# Patient Record
Sex: FEMALE | Race: WHITE | NOT HISPANIC OR LATINO | Employment: FULL TIME | URBAN - METROPOLITAN AREA
[De-identification: names, ages, dates, MRNs, and addresses within clinical notes are randomized per-mention and may not be internally consistent; named-entity substitution may affect disease eponyms.]

---

## 2019-07-01 ENCOUNTER — OFFICE VISIT (OUTPATIENT)
Dept: PHYSICAL MEDICINE AND REHAB | Facility: CLINIC | Age: 34
End: 2019-07-01
Payer: COMMERCIAL

## 2019-07-01 VITALS
HEIGHT: 63 IN | HEART RATE: 67 BPM | DIASTOLIC BLOOD PRESSURE: 77 MMHG | WEIGHT: 161 LBS | SYSTOLIC BLOOD PRESSURE: 122 MMHG | BODY MASS INDEX: 28.53 KG/M2

## 2019-07-01 DIAGNOSIS — M25.551 RIGHT HIP PAIN: Primary | ICD-10-CM

## 2019-07-01 DIAGNOSIS — S76.911A MUSCLE STRAIN OF RIGHT THIGH, INITIAL ENCOUNTER: ICD-10-CM

## 2019-07-01 PROCEDURE — 3008F BODY MASS INDEX DOCD: CPT | Mod: CPTII,S$GLB,, | Performed by: PHYSICAL MEDICINE & REHABILITATION

## 2019-07-01 PROCEDURE — 99204 OFFICE O/P NEW MOD 45 MIN: CPT | Mod: S$GLB,,, | Performed by: PHYSICAL MEDICINE & REHABILITATION

## 2019-07-01 PROCEDURE — 99204 PR OFFICE/OUTPT VISIT, NEW, LEVL IV, 45-59 MIN: ICD-10-PCS | Mod: S$GLB,,, | Performed by: PHYSICAL MEDICINE & REHABILITATION

## 2019-07-01 PROCEDURE — 99999 PR PBB SHADOW E&M-NEW PATIENT-LVL III: ICD-10-PCS | Mod: PBBFAC,,, | Performed by: PHYSICAL MEDICINE & REHABILITATION

## 2019-07-01 PROCEDURE — 97110 PR THERAPEUTIC EXERCISES: ICD-10-PCS | Mod: S$GLB,,, | Performed by: PHYSICAL MEDICINE & REHABILITATION

## 2019-07-01 PROCEDURE — 97110 THERAPEUTIC EXERCISES: CPT | Mod: S$GLB,,, | Performed by: PHYSICAL MEDICINE & REHABILITATION

## 2019-07-01 PROCEDURE — 99999 PR PBB SHADOW E&M-NEW PATIENT-LVL III: CPT | Mod: PBBFAC,,, | Performed by: PHYSICAL MEDICINE & REHABILITATION

## 2019-07-01 PROCEDURE — 3008F PR BODY MASS INDEX (BMI) DOCUMENTED: ICD-10-PCS | Mod: CPTII,S$GLB,, | Performed by: PHYSICAL MEDICINE & REHABILITATION

## 2019-07-01 NOTE — PATIENT INSTRUCTIONS
"Posture - Healthy spines and healthy running both start with good posture. The more you learn to sit and stand with good posture throughout the day, the more likely you are to run with it. Too often people allow the pelvis to tilt forward, the low back to arch, and the shoulders and head to round forward - similar to this picture from "Anatomy for Runners":        This posture leads to more weight on the heel than forefoot, compromising balance; and over-striding with running with lack of full use of the glutes for propulsion.  Fix it by following these steps when standing and before starting your run:  1. Squeeze the glutes to set the pelvis.  2. Drop your chest/rib cage down and lean forward just enough to feel equal weight on heels and forefeet.  3. Rotate arms/hands outward to pull the shoulder blades back, then allow forearms to rotate back in.     The girl above would then look more like this:      Follow link to a video reminder: [email or web address]    Mobilization:     Its generally a good idea to assess yourself for hotspots once a month by briefly mobilizing everything with a  or foam roller, and then spend several minutes every other day of the week, for 4 - 6 weeks working on those painful spots using thumbs, foam roll, rolling pins, lacrosse ball, etc. If it hurts, then you need to do it. Once it no longer hurts, take a break on that area for a few months.     Hip Flexor Mobility:       Picture from "Anatomy for Runners" by LISA Mehta    Quadriceps Mobilization:    Spend about 30 - 60 seconds on the whole quad, hitting the outside, middle, and inside portions.    IT Band Mobilization: Using 'the Stick', a foam roller, or lacross ball, roll all aspects of the IT band, but not over bony areas, for a minute or two every other day for about 6 weeks. You can also try pausing over a painful area and then flexing and extending the knee for 20 seconds. " "    From "Anatomy for Runners" by LISA Mehta    Working through these progressions:    Unless otherwise stated, you should only be doing one exercise from each progression listed below. These are listed in order of difficulty, so start with the first one in each list. Do as many reps as you can while maintaining excellent form. Stop doing the exercise if you fatigue or can't maintain proper form. Once you can do the recommended amount of reps for that exercise, stop doing that one and move on to the next exercise in that progression list during your next workout.     Hip Flexor Strengthening Progression:    1. Isometric hip flexion: 3 x 30 seconds    2. Lying Theraband Hip Flexor: 3 x 8 - 12 reps  Keep low back flat against floor. Alternate legs.      3. Standing Theraband Hip Flexor: 3 x 8 - 12      Bridge Progression: The purpose is to practice pushing the leg back using the glutes while maintaining a neutral spine. Start with your foot close enough to touch the heel. Brace your core without letting your back arch or flatten, or rotate for single leg exercises. Squeeze the glutes and drive down through the heel. If you feel tightness in the low back, you need to either brace the core more, use the glutes more, or don't lift the hips quite as high. Arms down is easier, arms up is harder.     1. Double leg bridge with arms up: 3 x 10 - 20        2. Bridge march with arms up: 3 x 20 Don't allow the hips to rotate.        3. Single leg bridge arms up: 3 x 10 - 15 each leg         Hip Abduction Progression:      Lateral control - This is one of the most common problems with runners and can contribute to many different injuries and wasted energy. Improving hip strength will help significantly. As your hips get stronger, think about actively squeezing your glutes when you run - that will help get these muscles firing. Do only 1 of these exercises per workout and advance to the next exercise once you can do the " recommended amount of reps.     1. Clam shells: 3 x 30  Stay perpendicular to ground, knees bent 90 degrees, feet lined up even with low back. Brace core & squeeze glutes throughout the exercise. Lift top knee only as far as you can without rolling backwards.           2. Side lying leg lift (keep leg slightly extended): 3 x 15      3. Seven way hips: Start with 3 x 2 - 3 reps and progress to 3 x 8 - 10 reps. Follow this link:  https://www.Tocagenube.com/watch?v=YdenOdoz-MI      4. Standing hip hikes: 3 x 20  (bonus points: hold core tight, good posture with weight on forefoot, and toe yoga)        5. Jennifer hip exercises: 3 x 8 - 12 (Start with 8 reps. Once you build up to 12, use a tighter band.)    A. Theraband pulling straight out to side.             B. Theraband pulling 45 degrees posterior.            C. Standing hip rotations with resistance:  https://www.Tocagenube.com/watch?v=gfHmUIuDmG0          6. Monster Walks: 3 x 30 - 60 seconds. See video: https://www.Tocagenube.com/watch?k=fgA760NJMwr  You could also do monster walks going around a small square with 5 to 10 foot sides. Go around it in each direction.    7. Standing Clamshells: 3 x 8 - 12 reps  https://www.Tocagenube.com/watch?v=CGOsTJoK0OF      Stretching for Range of Motion:    Remember not to do static stretching before running or working out. Do something to warm up instead. Its best to stretch after runs or workouts, or any time on rest days. Stretch at least 4 days per week, 1 - 2 minutes per stretch. It will take 10 - 12 weeks to get the full benefit.     Hip flexors: Use whichever stretch you prefer, the kneeling or lunging type, or switch them up to keep it exciting.     rotate pelvis backwards and flatten the low back                       Hold this position for several minutes.

## 2019-07-01 NOTE — PROGRESS NOTES
PM&R NEW PATIENT HISTORY & PHYSICAL:    Referring Physician: Self, Aaareferral    Chief Complaint   Patient presents with    Hip Pain     Right hip pain       HPI: This is a 34 y.o.  female being seen in clinic today for evaluation of Hip Pain (Right hip pain)   The problem first began several months ago without particular injury. She feels tightness and pain in her right hip. She has started doing more HIIT exercises and classes since January. Noticed she seems tighter on that side. The symptoms show no change. She hasn't tried particular treatment. She has not tried physical therapy. She's been doing Regymen around 3 or 4 days a week and thinks she may need something lower impact. She does not run outside of those workouts. 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     History obtained from patient.    Past family, medical, social, surgical history, and vital signs reviewed in chart.    Review of Systems   Constitutional: Negative for chills, fever and weight loss.   HENT: Negative for hearing loss and sore throat.    Eyes: Negative for blurred vision, photophobia and pain.   Respiratory: Negative for shortness of breath.    Cardiovascular: Negative for chest pain.   Gastrointestinal: Negative for abdominal pain.   Genitourinary: Negative for dysuria.   Skin: Negative for rash.   Neurological: Negative for tingling and headaches.   Endo/Heme/Allergies: Does not bruise/bleed easily.   Psychiatric/Behavioral: Negative for depression.       Physical Exam   Constitutional: She is oriented to person, place, and time. She appears well-developed and well-nourished.   HENT:   Head: Normocephalic and atraumatic.   Eyes: Pupils are equal, round, and reactive to light. EOM are normal.   Neck: Normal range of motion. Neck supple.   Cardiovascular: Intact distal pulses.   Pulmonary/Chest: Effort normal.   Abdominal: She exhibits no distension.   Musculoskeletal:        Right hip: She exhibits decreased range of motion (mildly tighter  rectus femoris on right > left hip. Normal IR/ER.), decreased strength (4/5 hip abduction. Pain with resisted hip flexion, more so with knee straight.) and tenderness (near AIIS and over femoral head/insertion of iliopsoas).   Neurological: She is alert and oriented to person, place, and time. She has normal strength and normal reflexes. No sensory deficit.   Skin: Skin is warm and dry.   Psychiatric: She has a normal mood and affect.   Vitals reviewed.      IMPRESSION/PLAN: Tawnya is a 34 y.o.  female with:    1. Right hip pain    2. Muscle strain of right thigh, initial encounter     The findings were discussed with Kasey in detail. The hip joint seems fine. This appears to be muscular, probably the rectus femoris. We discussed that backing off the HIIT training and focusing more on strength training and better control during exercise would be helpful. We also discussed therapeutic exercises to do as part of a HEP and she was taught these, given a theraband to use, and given the exercises in writing. All of her questions were answered. She was provided this plan in writing. She will follow-up with me in 4 weeks. If she isn't making enough progress on her own, we'll get her into PT.     Paige Paige M.D.  Physical Medicine and Rehab

## 2019-07-31 ENCOUNTER — OFFICE VISIT (OUTPATIENT)
Dept: DERMATOLOGY | Facility: CLINIC | Age: 34
End: 2019-07-31
Payer: COMMERCIAL

## 2019-07-31 ENCOUNTER — PATIENT MESSAGE (OUTPATIENT)
Dept: DERMATOLOGY | Facility: CLINIC | Age: 34
End: 2019-07-31

## 2019-07-31 DIAGNOSIS — R22.9 SUBCUTANEOUS NODULE: Primary | ICD-10-CM

## 2019-07-31 DIAGNOSIS — L72.0 MILIA: ICD-10-CM

## 2019-07-31 PROCEDURE — 99999 PR PBB SHADOW E&M-EST. PATIENT-LVL II: CPT | Mod: PBBFAC,,, | Performed by: STUDENT IN AN ORGANIZED HEALTH CARE EDUCATION/TRAINING PROGRAM

## 2019-07-31 PROCEDURE — 99202 PR OFFICE/OUTPT VISIT, NEW, LEVL II, 15-29 MIN: ICD-10-PCS | Mod: S$GLB,,, | Performed by: STUDENT IN AN ORGANIZED HEALTH CARE EDUCATION/TRAINING PROGRAM

## 2019-07-31 PROCEDURE — 99202 OFFICE O/P NEW SF 15 MIN: CPT | Mod: S$GLB,,, | Performed by: STUDENT IN AN ORGANIZED HEALTH CARE EDUCATION/TRAINING PROGRAM

## 2019-07-31 PROCEDURE — 99999 PR PBB SHADOW E&M-EST. PATIENT-LVL II: ICD-10-PCS | Mod: PBBFAC,,, | Performed by: STUDENT IN AN ORGANIZED HEALTH CARE EDUCATION/TRAINING PROGRAM

## 2019-07-31 NOTE — PROGRESS NOTES
Subjective:       Patient ID:  Tawnya Herrera is a 34 y.o. female who presents for   Chief Complaint   Patient presents with    Cyst     in front of right ear x 2 months, hard, tender, no tx     History of Present Illness: The patient presents with chief complaint of lesion  Location: in front of right ear  Duration: 2 months  Signs/Symptoms: hard nodule, painful when chewing food  Prior treatments: none    Patient complains of lesion(s) - skin lesion  Location: bridge of nose  Duration: months  Symptoms: firm papule  Relieving factors/Previous treatments: none        Review of Systems   Skin: Negative for itching, rash and dry skin.        Objective:    Physical Exam   Constitutional: She appears well-developed and well-nourished. No distress.   Neurological: She is alert and oriented to person, place, and time. She is not disoriented.   Psychiatric: She has a normal mood and affect.   Skin:   Areas Examined (abnormalities noted in diagram):   Head / Face Inspection Performed  Neck Inspection Performed              Diagram Legend     Erythematous scaling macule/papule c/w actinic keratosis       Vascular papule c/w angioma      Pigmented verrucoid papule/plaque c/w seborrheic keratosis      Yellow umbilicated papule c/w sebaceous hyperplasia      Irregularly shaped tan macule c/w lentigo     1-2 mm smooth white papules consistent with Milia      Movable subcutaneous cyst with punctum c/w epidermal inclusion cyst      Subcutaneous movable cyst c/w pilar cyst      Firm pink to brown papule c/w dermatofibroma      Pedunculated fleshy papule(s) c/w skin tag(s)      Evenly pigmented macule c/w junctional nevus     Mildly variegated pigmented, slightly irregular-bordered macule c/w mildly atypical nevus      Flesh colored to evenly pigmented papule c/w intradermal nevus       Pink pearly papule/plaque c/w basal cell carcinoma      Erythematous hyperkeratotic cursted plaque c/w SCC      Surgical scar with no sign of  skin cancer recurrence      Open and closed comedones      Inflammatory papules and pustules      Verrucoid papule consistent consistent with wart     Erythematous eczematous patches and plaques     Dystrophic onycholytic nail with subungual debris c/w onychomycosis     Umbilicated papule    Erythematous-base heme-crusted tan verrucoid plaque consistent with inflamed seborrheic keratosis     Erythematous Silvery Scaling Plaque c/w Psoriasis     See annotation      Assessment / Plan:        Subcutaneous nodule - appears very deep to the skin and likely within the parotid gland. Will have patient to schedule with ENT for evaluation .     Milia  Lesion on nose, lanced with 11 blade and manually expressed.            Follow up if symptoms worsen or fail to improve.

## 2019-08-01 ENCOUNTER — OFFICE VISIT (OUTPATIENT)
Dept: OTOLARYNGOLOGY | Facility: CLINIC | Age: 34
End: 2019-08-01
Payer: COMMERCIAL

## 2019-08-01 VITALS
HEART RATE: 63 BPM | BODY MASS INDEX: 29.21 KG/M2 | WEIGHT: 164.88 LBS | SYSTOLIC BLOOD PRESSURE: 104 MMHG | TEMPERATURE: 99 F | DIASTOLIC BLOOD PRESSURE: 67 MMHG

## 2019-08-01 DIAGNOSIS — K11.8 PAROTID MASS: Primary | ICD-10-CM

## 2019-08-01 PROCEDURE — 99999 PR PBB SHADOW E&M-EST. PATIENT-LVL II: CPT | Mod: PBBFAC,,, | Performed by: PHYSICIAN ASSISTANT

## 2019-08-01 PROCEDURE — 99999 PR PBB SHADOW E&M-EST. PATIENT-LVL II: ICD-10-PCS | Mod: PBBFAC,,, | Performed by: PHYSICIAN ASSISTANT

## 2019-08-01 PROCEDURE — 99203 OFFICE O/P NEW LOW 30 MIN: CPT | Mod: S$GLB,,, | Performed by: PHYSICIAN ASSISTANT

## 2019-08-01 PROCEDURE — 3008F BODY MASS INDEX DOCD: CPT | Mod: CPTII,S$GLB,, | Performed by: PHYSICIAN ASSISTANT

## 2019-08-01 PROCEDURE — 3008F PR BODY MASS INDEX (BMI) DOCUMENTED: ICD-10-PCS | Mod: CPTII,S$GLB,, | Performed by: PHYSICIAN ASSISTANT

## 2019-08-01 PROCEDURE — 99203 PR OFFICE/OUTPT VISIT, NEW, LEVL III, 30-44 MIN: ICD-10-PCS | Mod: S$GLB,,, | Performed by: PHYSICIAN ASSISTANT

## 2019-08-01 NOTE — PROGRESS NOTES
Referring Provider:    Zaheer Carvajal Md  [address] [facility]  [place] [address]  Subjective:   Patient: Tawnya Herrera [id number], :1985   Visit date:2019 3:37 PM    Chief Complaint:  Lesion (In front of right ear noticed 2wks ago)    HPI:  Tawnya is a 34 y.o. female who I was asked to see in consultation for evaluation of the following issue(s):    Patient first presented to clinic on 19 for evaluation of a nodule in front of her R ear x 2 weeks. Patient reported first noticing this as well as associated pain when chewing chewy things such as jerky or a granola bar. She denied any hx of TMJ. No growth/change. No other mass or lymphadenopathy. She has never smoked.  No recent illness, no fevers. No unintentional weight loss. No relieving factors.     Review of Systems:  Negative unless checked off.  Gen:  []fever   []fatigue  HENT:  []nosebleeds  []dental problem   Eyes:  []photophobia  []visual disturbance  Resp:  []chest tightness []wheezing  Card:  []chest pain  []leg swelling  GI:  []abdominal pain []blood in stool  :  []dysuria  []hematuria  Musc:  []joint swelling  []gait problem  Skin:  []color change  []pallor  Neuro:  []seizures  []numbness  Hem:  []bruise/bleed easily  Psych:  []hallucinations  []behavioral problems  Allergy/Imm: has No Known Allergies.    Her meds, allergies, medical, surgical, social & family histories were reviewed & updated:  -     She currently has no medications in their medication list.  -     She  has no past medical history on file.   -     She does not have any pertinent problems on file.   -     She  has no past surgical history on file.  -     She  reports that she has never smoked. She has never used smokeless tobacco. She reports that she drinks alcohol.  -     Her family history is not on file.  -     She has No Known Allergies.    Objective:     Physical Exam:  Vitals:  /67   Pulse 63   Temp 98.7 °F (37.1 °C) (Tympanic)   Wt 74.8 kg  (164 lb 14.5 oz)   BMI 29.21 kg/m²   General appearance:  Well developed, well nourished    Eyes:  Extraocular motions intact, PERRL    Communication:  no hoarseness, no dysphonia    Ears:  Otoscopy of external auditory canals and tympanic membranes was normal, clinical speech reception thresholds grossly intact, no mass/lesion of auricle.  Nose:  No masses/lesions of external nose, nasal mucosa, septum, and turbinates were within normal limits.  Mouth:  No mass/lesion of lips, teeth, gums, hard/soft palate, tongue, tonsils, or oropharynx. Subluxation of R TMJ with opening her jaw. Small, soft palpable area of R parotid with mild tenderness.     Cardiovascular:  No pedal edema; Radial Pulses +2     Neck & Lymphatics:  No cervical lymphadenopathy, no neck mass/crepitus/ asymmetry, trachea is midline, no thyroid enlargement/tenderness/mass.    Psych: Oriented x3,  Alert with normal mood and affect.     Respiration/Chest:  Symmetric expansion during respiration, normal respiratory effort.    Skin:  Warm and intact. No ulcerations of face, scalp, neck.    Assessment & Plan:   Tawnya was seen today for lesion.    Diagnoses and all orders for this visit:    Parotid mass  -     MRI Maxillofacial W W/O Contrast; Future    Pt was evaluated by myself and Dr. Herrera. I really believe most of her discomfort is coming from the jaw/arthralgia especially with chewing. Pt defers this. Region of tenderness has her concerned as well as the discomfort. Due to location (above facial nerve) will obtain imaging to further evaluate for mass. I will call pt with results.     We discussed her medical conditions, treatments and plan.  Tawnya should return to clinic if any issues arise (symptoms worsen or persist), otherwise we will see her back in the clinic only as needed.    Thank you for allowing me to participate in the care of Tawnya.      Kirstin Arredondo PA-C  [facility]   [facility]  [address] [place] [address]  P: [phone]  F: [phone]

## 2022-02-18 DIAGNOSIS — M25.571 BILATERAL ANKLE PAIN, UNSPECIFIED CHRONICITY: Primary | ICD-10-CM

## 2022-02-18 DIAGNOSIS — M25.572 BILATERAL ANKLE PAIN, UNSPECIFIED CHRONICITY: Primary | ICD-10-CM

## 2022-02-21 ENCOUNTER — HOSPITAL ENCOUNTER (OUTPATIENT)
Dept: RADIOLOGY | Facility: HOSPITAL | Age: 37
Discharge: HOME | End: 2022-02-21
Attending: PHYSICAL MEDICINE & REHABILITATION
Payer: COMMERCIAL

## 2022-02-21 ENCOUNTER — OFFICE VISIT (OUTPATIENT)
Dept: SPORTS MEDICINE | Facility: CLINIC | Age: 37
End: 2022-02-21
Payer: COMMERCIAL

## 2022-02-21 VITALS — HEIGHT: 64 IN | WEIGHT: 184 LBS | BODY MASS INDEX: 31.41 KG/M2

## 2022-02-21 DIAGNOSIS — R26.9 GAIT ABNORMALITY: ICD-10-CM

## 2022-02-21 DIAGNOSIS — M25.571 ACUTE BILATERAL ANKLE PAIN: Primary | ICD-10-CM

## 2022-02-21 DIAGNOSIS — M25.572 BILATERAL ANKLE PAIN, UNSPECIFIED CHRONICITY: ICD-10-CM

## 2022-02-21 DIAGNOSIS — M25.572 ACUTE BILATERAL ANKLE PAIN: Primary | ICD-10-CM

## 2022-02-21 DIAGNOSIS — M25.571 BILATERAL ANKLE PAIN, UNSPECIFIED CHRONICITY: ICD-10-CM

## 2022-02-21 PROCEDURE — 99999 PR PBB SHADOW E&M-EST. PATIENT-LVL IV: ICD-10-PCS | Mod: PBBFAC,,, | Performed by: PHYSICAL MEDICINE & REHABILITATION

## 2022-02-21 PROCEDURE — 73610 XR ANKLE COMPLETE 3 VIEW BILATERAL: ICD-10-PCS | Mod: 26,,, | Performed by: RADIOLOGY

## 2022-02-21 PROCEDURE — 73610 X-RAY EXAM OF ANKLE: CPT | Mod: TC,50

## 2022-02-21 PROCEDURE — 99214 OFFICE O/P EST MOD 30 MIN: CPT | Mod: S$GLB,,, | Performed by: PHYSICAL MEDICINE & REHABILITATION

## 2022-02-21 PROCEDURE — 1159F PR MEDICATION LIST DOCUMENTED IN MEDICAL RECORD: ICD-10-PCS | Mod: CPTII,S$GLB,, | Performed by: PHYSICAL MEDICINE & REHABILITATION

## 2022-02-21 PROCEDURE — 3008F BODY MASS INDEX DOCD: CPT | Mod: CPTII,S$GLB,, | Performed by: PHYSICAL MEDICINE & REHABILITATION

## 2022-02-21 PROCEDURE — 99214 PR OFFICE/OUTPT VISIT, EST, LEVL IV, 30-39 MIN: ICD-10-PCS | Mod: S$GLB,,, | Performed by: PHYSICAL MEDICINE & REHABILITATION

## 2022-02-21 PROCEDURE — 1160F PR REVIEW ALL MEDS BY PRESCRIBER/CLIN PHARMACIST DOCUMENTED: ICD-10-PCS | Mod: CPTII,S$GLB,, | Performed by: PHYSICAL MEDICINE & REHABILITATION

## 2022-02-21 PROCEDURE — 3008F PR BODY MASS INDEX (BMI) DOCUMENTED: ICD-10-PCS | Mod: CPTII,S$GLB,, | Performed by: PHYSICAL MEDICINE & REHABILITATION

## 2022-02-21 PROCEDURE — 73610 X-RAY EXAM OF ANKLE: CPT | Mod: 26,,, | Performed by: RADIOLOGY

## 2022-02-21 PROCEDURE — 1160F RVW MEDS BY RX/DR IN RCRD: CPT | Mod: CPTII,S$GLB,, | Performed by: PHYSICAL MEDICINE & REHABILITATION

## 2022-02-21 PROCEDURE — 99999 PR PBB SHADOW E&M-EST. PATIENT-LVL IV: CPT | Mod: PBBFAC,,, | Performed by: PHYSICAL MEDICINE & REHABILITATION

## 2022-02-21 PROCEDURE — 1159F MED LIST DOCD IN RCRD: CPT | Mod: CPTII,S$GLB,, | Performed by: PHYSICAL MEDICINE & REHABILITATION

## 2022-02-21 NOTE — PROGRESS NOTES
SPORTS MEDICINE / PM&R New Patient Visit :    Referring Physician: Self, Aaareferral    Chief Complaint   Patient presents with    Left Ankle - Pain    Right Ankle - Pain       HPI: This is a 36 y.o.  female being seen in clinic today for evaluation of Pain of the Left Ankle and Pain of the Right Ankle      The problem began 4 months ago.  She feels dull, intermittent and pain with movement pain in her bilateral ankles. The symptoms show no change. She has tried essential oils and rest without improvement. She has not tried therapy.     History obtained from patient.  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  She states that since increasing the mileage she developed an aggravating stiffness in her bilateral ankles.  She was able to run in the 1/4 marathon on January 15, 2022 and hasn't really been able to run since then.  She was running in [facility] and is slowly transitioning into [facility].      Past family, medical, social, surgical history, and vital signs reviewed in chart.    General    Nursing note and vitals reviewed.  Constitutional: She is oriented to person, place, and time. She appears well-developed and well-nourished.   HENT:   Head: Normocephalic and atraumatic.   Eyes: Conjunctivae and EOM are normal. Pupils are equal, round, and reactive to light.   Neck: Neck supple.   Cardiovascular: Intact distal pulses.    Pulmonary/Chest: Effort normal. No respiratory distress.   Abdominal: She exhibits no distension.   Neurological: She is alert and oriented to person, place, and time. She has normal reflexes.   Psychiatric: She has a normal mood and affect.         Right Ankle/Foot Exam     Inspection   Deformity: absent  Erythema: absent  Bruising: Ankle - absent Foot - absent  Effusion: Ankle - absent Foot - absent  Atrophy: Ankle - absent Foot - absent    Range of Motion   Ankle Joint   Dorsiflexion: normal   Plantar flexion:  normal   Subtalar Joint   Inversion: normal   Eversion: normal   First MTP Joint: normal    Alignment   Hindfoot Alignment: neutral  Forefoot Alignment: normal    Muscle Strength   The patient has normal right ankle strength.    Other   Sensation: normal    Comments:  She was tender to palpation near the anterior aspect of the medial malleolus and the anterior medial ankle joint bilaterally.  There is no significant tenderness over the tip posterior tendon or the plantar fascia.  She had relatively well-preserved range of motion bilaterally.  She was noted to have flexible pes planus and could not maintain arch height in stance.  She did show calcaneal inversion with double leg calf raises.  She had somewhat poor balance and stability in single leg stance.    Left Ankle/Foot Exam     Inspection  Deformity: absent  Erythema: absent  Bruising: Ankle - absent Foot - absent  Effusion: Ankle - absent Foot - absent  Atrophy: Ankle - absent Foot - absent    Range of Motion   Ankle Joint  Dorsiflexion: normal   Plantar flexion: normal     Subtalar Joint   Inversion: normal   Eversion: normal   First MTP Joint: normal    Alignment   Hindfoot Alignment: neutral  Forefoot Alignment: normal    Muscle Strength   The patient has normal left ankle strength.    Other   Sensation: normal      Reflexes     Left Side  Achilles:  2+    Right Side   Achilles:  2+    Vascular Exam     Right Pulses  Dorsalis Pedis:      2+          Left Pulses  Dorsalis Pedis:      2+              X-Ray Ankle Complete Bilateral  Narrative: EXAMINATION:  XR ANKLE COMPLETE 3 VIEW BILATERAL    CLINICAL HISTORY:  Pain in right ankle and joints of right foot    TECHNIQUE:  AP, lateral and oblique views of both ankles were performed.    COMPARISON:  None    FINDINGS:  No fracture or dislocation.  No radiographic evidence of an osteochondral lesion.  Well corticated density projects along the inferior margin of the right lateral malleolus suggestive of an  accessory ossicle.  Soft tissues are within normal limits.  Impression: As above    Electronically signed by: Maikel Rome MD  Date:    02/21/2022  Time:    08:04         Patient Instructions     Assessment:  Tawnya Herrera is a 36 y.o. female   Chief Complaint   Patient presents with    Left Ankle - Pain    Right Ankle - Pain       Acute bilateral ankle pain    Gait abnormality        Plan:   Discussed radiologist report with patient and agree with findings.   Discussed shoe wear while training with patient and suggested that patient alternate shoes that she runs in. Research has found that alternating shoes while training have a lower incidence of injury   Basically a neutral foot type, with slight pronation and suggests that additional ankle control and strengthening should be targeted.   Refer patient for physical therapy, if no improvement in 4 weeks then will consider advanced imaging.     Discussed trying over the counter orthotics   Continue with walking and slowly progress to a walk / jog pattern being careful not to overdue to avoid aggravation.    Safe Start to Running  Unless you have a track nearby or well-known distances, it is much easier to buy a wristwatch and exercise by time. Start with walking a distance/time that you can do without much difficulty.  Aim for 3 days a week. When you can comfortably walk 30 minutes, then you can start running.  Walk 4:30, then jog 30 seconds (this is like one set), and repeat 6 times for a total exercise time of 30 minutes.   Each week decrease the walk time and increase the run time by 30 seconds for each set.    As your running time increases, you may want to consider adding an initial walking time to warm-up before the run, but dont count this toward the 30 minute total.  When you can run 30 minutes straight, you may consider adding an additional day of running.  Again, start this fourth day at less of a total run time, but you should be able to  build up time much more quickly.    Always remember the activity guidelines:  1. No running with pain > 3/10 on the 0 - 10 pain scale.  2. No running if limping or changing your gait.  3. Ok to run with a pain that goes away with running, but NOT ok to run with a pain that gets worse the further you go.  4. Increase your total weekly mileage or time by no more than 10%    Its better to go too slow than too fast! Not only do your heart and lungs need to adapt, but it takes your bones, muscles, ligaments, and tendons weeks to months to adapt to the strain of running. You cant rush greatness!    Toe Yoga - The purpose is to give you a smarter, stronger, and more stable foot. The big toe accounts for about 85% of our stability, get it stronger! Start with coordination:  1. Keeping little toes down, lift the big toe.  2. Put big toe down, lift the small toes.  3. Alternate for 20 - 30 seconds before taking a break.  4. Progress from doing this sitting to standing to single leg stance.        For Foot Strength:  1. Pick the little toes up, drive the big toe down without curling it.  2. Gradually hold this squeezing for longer and longer.  3. Again progress from sitting to standing to single leg stance.  4. Start incorporating this with standing exercises like squats and deadlifts and all single leg work.     For a good primer, check out this link - toe yoga coordination - step 1    And then this one - toe yoga strength - steps 2 & 3      Or this one - Are You Ready to go Minimal: ready to go minimal video        Follow-up: 6 weeks or sooner if there are any problems between now and then.    Thank you for choosing [facility] and Dr. Paige Paige for your orthopedic & sports medicine care. It is our goal to provide you with exceptional care that will help keep you healthy, active, and get you back in the game.    Please do not hesitate to reach out to us via email, phone, or MyChart with any  questions, concerns, or feedback.    If you felt that you received exemplary care today, please consider leaving us feedback on Healthgrades at:  [email or web address]     If you are experiencing pain/discomfort ,or have questions after 5pm and would like to be connected to the [facility] on-call team, please call this number and specify which Sports Medicine provider is treating you: [phone]         Disclaimer: This note was prepared using a voice recognition system and is likely to have sound alike errors within the text.     Paige Paige M.D.  Sports Medicine

## 2022-02-21 NOTE — PATIENT INSTRUCTIONS
Assessment:  Tawnya Herrera is a 36 y.o. female   Chief Complaint   Patient presents with    Left Ankle - Pain    Right Ankle - Pain       Acute bilateral ankle pain    Gait abnormality        Plan:  Discussed radiologist report with patient and agree with findings.  Discussed shoe wear while training with patient and suggested that patient alternate shoes that she runs in. Research has found that alternating shoes while training have a lower incidence of injury  Basically a neutral foot type, with slight pronation and suggests that additional ankle control and strengthening should be targeted.  Refer patient for physical therapy, if no improvement in 4 weeks then will consider advanced imaging.    Discussed trying over the counter orthotics  Continue with walking and slowly progress to a walk / jog pattern being careful not to overdue to avoid aggravation.    Safe Start to Running  Unless you have a track nearby or well-known distances, it is much easier to buy a wristwatch and exercise by time. Start with walking a distance/time that you can do without much difficulty.  Aim for 3 days a week. When you can comfortably walk 30 minutes, then you can start running.  Walk 4:30, then jog 30 seconds (this is like one set), and repeat 6 times for a total exercise time of 30 minutes.   Each week decrease the walk time and increase the run time by 30 seconds for each set.    As your running time increases, you may want to consider adding an initial walking time to warm-up before the run, but dont count this toward the 30 minute total.  When you can run 30 minutes straight, you may consider adding an additional day of running.  Again, start this fourth day at less of a total run time, but you should be able to build up time much more quickly.    Always remember the activity guidelines:  1. No running with pain > 3/10 on the 0 - 10 pain scale.  2. No running if limping or changing your gait.  3. Ok to run with a pain  that goes away with running, but NOT ok to run with a pain that gets worse the further you go.  4. Increase your total weekly mileage or time by no more than 10%    Its better to go too slow than too fast! Not only do your heart and lungs need to adapt, but it takes your bones, muscles, ligaments, and tendons weeks to months to adapt to the strain of running. You cant rush greatness!    Toe Yoga - The purpose is to give you a smarter, stronger, and more stable foot. The big toe accounts for about 85% of our stability, get it stronger! Start with coordination:  1. Keeping little toes down, lift the big toe.  2. Put big toe down, lift the small toes.  3. Alternate for 20 - 30 seconds before taking a break.  4. Progress from doing this sitting to standing to single leg stance.        For Foot Strength:  1. Pick the little toes up, drive the big toe down without curling it.  2. Gradually hold this squeezing for longer and longer.  3. Again progress from sitting to standing to single leg stance.  4. Start incorporating this with standing exercises like squats and deadlifts and all single leg work.     For a good primer, check out this link - toe yoga coordination - step 1    And then this one - toe yoga strength - steps 2 & 3      Or this one - Are You Ready to go Minimal: ready to go minimal video        Follow-up: 6 weeks or sooner if there are any problems between now and then.    Thank you for choosing [facility] and Dr. Paige Paige for your orthopedic & sports medicine care. It is our goal to provide you with exceptional care that will help keep you healthy, active, and get you back in the game.    Please do not hesitate to reach out to us via email, phone, or [employer] with any questions, concerns, or feedback.    If you felt that you received exemplary care today, please consider leaving us feedback on Healthgrades at:  [email or web address]     If you are  experiencing pain/discomfort ,or have questions after 5pm and would like to be connected to the [facility] on-call team, please call this number and specify which Sports Medicine provider is treating you: [phone]

## 2022-03-07 ENCOUNTER — CLINICAL SUPPORT (OUTPATIENT)
Dept: REHABILITATION | Facility: HOSPITAL | Age: 37
End: 2022-03-07
Payer: COMMERCIAL

## 2022-03-07 DIAGNOSIS — R26.9 GAIT ABNORMALITY: ICD-10-CM

## 2022-03-07 DIAGNOSIS — M25.571 ACUTE BILATERAL ANKLE PAIN: ICD-10-CM

## 2022-03-07 DIAGNOSIS — M25.572 ACUTE BILATERAL ANKLE PAIN: ICD-10-CM

## 2022-03-07 PROCEDURE — 97110 THERAPEUTIC EXERCISES: CPT

## 2022-03-07 PROCEDURE — 97161 PT EVAL LOW COMPLEX 20 MIN: CPT

## 2022-03-07 NOTE — PLAN OF CARE
[facility]  Physical Therapy Initial Evaluation    Name: Tawnya Herrera  Clinic Number: [id number]    Therapy Diagnosis:   Encounter Diagnoses   Name Primary?    Acute bilateral ankle pain     Gait abnormality      Physician: Paige Paige MD    Physician Orders: PT Eval and Treat    Medical Diagnosis from Referral: bilateral ankle pain   Evaluation Date: 3/7/2022  Authorization Period Expiration: 12/01/2022  Plan of Care Expiration: 5/2/2022  Visit # / Visits authorized: 1/ 1    Time In: 5:00 pm   Time Out: 6:00 pm   Total Billable Time: 25 minutes    Precautions: Standard    Subjective   Date of onset: x months   History of current condition - Kasey reports: she began developing pain in bilateral ankles at the end of last year. She had increased her jogging mileage slightly as she prepared for a 1/4 marathon run. After the run it seemed to be worse and now she has continued nagging aching pain in both ankles . Will vary in intensity and which foot is worse .   No correlation with change in shoes but did increase mileage.      Pain:  Current 1/10, worst 5/10, best 0/10   Location: bilateral medial malleolus    Description: Aching and Dull. No numbness / tingling   Aggravating Factors: Standing, Walking and jogging   Easing Factors: rest    Prior Therapy: none  Social History:   lives alone  Occupation: x ray tech  Prior Level of Function: independent   Current Level of Function: independent     Imaging, x -ray 2-:   No fracture or dislocation.  No radiographic evidence of an osteochondral lesion.  Well corticated density projects along the inferior margin of the right lateral malleolus suggestive of an accessory ossicle.  Soft tissues are within normal limits.    Medical History:   No past medical history on file.    Surgical History:   Tawnya Herrera  has a past surgical history that includes [place] tooth extraction and Intrauterine device insertion  (12/13/2021).    Medications:   Tawnya has a current medication list which includes the following prescription(s): misoprostol and tinidazole, and the following Facility-Administered Medications: copper intrauterine device and misoprostol.    Allergies:   Review of patient's allergies indicates:  No Known Allergies     Pts goals: to have pain free walking / running - able to resume recreational activity    Objective       CMS Impairment/Limitation/Restriction for FOTO Survey    Therapist reviewed FOTO scores for Tawnya Herrera on 3/7/2022.   FOTO documents entered into [employer] - see Media section.    Limitation Score: 25%       Squat:  Double leg: mild pes planus   Single leg: genu valgum R>L with mild pes planus and replication of symptoms     Structure/observation: No significant swelling or bruising , no discoloration     Ankle A/PROM:      (L)   (R)  Plantar Flexion   WNL   WNL  Dorsi Flexion   WNL   WNL  Inversion   WNL   WNL  Eversion   WNL   WNL  Great Toe Extension   90 deg   90 deg   * pain on overpressure with eversion     Strength L/R:  Anterior tibialis   5/5   5/5  Posterior tibialis  5/5   5/5  Gastrocnemius  5/5   5/5  Hip abduction    4/5   4/5  Hip Ext Rotation  3+/5   3+/5    Special test L/R:   Anterior Drawer negative   negative  Talar Tilt  negative   negative      Joint mobility:  Mild decreased tibial glide and plantar navicular glide     Lower Limb Tension Test:  Negative      Tenderness to palpation:  Tenderness at the talar-navicular joint and over medial malleollus         TREATMENT   Treatment Time In: 5:00 pm   Treatment Time Out: 6:00 pm   Total Treatment time separate from Evaluation: 25 minutes    Kasey received therapeutic exercises to develop strength, endurance and ROM for 25 minutes including:  Toe yoga   Single leg step down / squat  Side ly hip external rotation  Single leg RDL    Home Exercises and Patient Education Provided    Education provided:   -Education on condition,  HEP, and plan of care     Written Home Exercises Provided: yes.  Exercises were reviewed and Kasey was able to demonstrate them prior to the end of the session.  Kasey demonstrated good  understanding of the education provided.     See EMR under Patient Instructions for exercises provided 3/7/2022.    Assessment   Tawnya is a 36 y.o. female referred to outpatient Physical Therapy with a medical diagnosis of acute bilateral ankle pain. Pt presents with tenderness vaguely about the medial malleoli that , on palpation appears to be localized about the talonavicular joint and extends more broadly to the medial malleolus itself. Her general ROM and strength about the ankle is good , however she dos have some weakness of the hip rotators and in a close chain single leg squat is noted to have a general vagus pattern with pronation of the foot and replication of pain. It is likely that with multiple repetitions of single leg landing she is exceeding tissue tolerance at the dorsomedial ankle.     Pt prognosis is Excellent.   Pt will benefit from skilled outpatient Physical Therapy to address the deficits stated above and in the chart below, provide pt/family education, and to maximize pt's level of independence.     Plan of care discussed with patient: Yes  Pt's spiritual, cultural and educational needs considered and patient is agreeable to the plan of care and goals as stated below:     Anticipated Barriers for therapy: none    Medical Necessity is demonstrated by the following  History  Co-morbidities and personal factors that may impact the plan of care Co-morbidities:   none significant     Personal Factors:   no deficits     low   Examination  Body Structures and Functions, activity limitations and participation restrictions that may impact the plan of care Body Regions:   lower extremities    Body Systems:    strength  gross coordinated movement  gait  motor control    Participation Restrictions:   Jogging, squatting  ", recreational exercise , work    Activity limitations:   Learning and applying knowledge  no deficits    General Tasks and Commands  no deficits    Communication  no deficits    Mobility  walking    Self care  looking after one's health    Domestic Life  no deficits    Interactions/Relationships  no deficits    Life Areas  employment    Community and Social Life  community life  recreation and leisure         low   Clinical Presentation stable and uncomplicated low   Decision Making/ Complexity Score: low     Goals:  Short Term Goals:    1.I with HEP  2.Patient to report a subjective decrease in pain   3. Patient to demo step down from 4" step with good knee / hip control and no valgus    Long Term Goals:  1. Patient to perform daily activities including walking community distances and at work without limitation.  2. Patient to demonstrate increased hip ER strength to 4/5 or greater.  3. Patient to have no pain on passive eversion  4. Patient to be able to jog x 30 mins with no pain.      Plan   Plan of care Certification: 3/7/2022 to 5/2/2022.    Outpatient Physical Therapy 2 times weekly for 8 weeks to include the following interventions: Electrical Stimulation prn, dry needling prn, Manual Therapy, Moist Heat/ Ice, Neuromuscular Re-ed, Orthotic Management and Training, Patient Education, Self Care, Therapeutic Activities and Therapeutic Exercise.     Alexander Pruitt, PT    Thank you for this referral.    These services are reasonable and necessary for the conditions set forth above while under my care.      "

## 2022-03-09 PROBLEM — R26.9 GAIT ABNORMALITY: Status: ACTIVE | Noted: 2022-03-09

## 2022-03-09 PROBLEM — M25.572 ACUTE BILATERAL ANKLE PAIN: Status: ACTIVE | Noted: 2022-03-09

## 2022-03-09 PROBLEM — M25.571 ACUTE BILATERAL ANKLE PAIN: Status: ACTIVE | Noted: 2022-03-09

## 2022-03-14 ENCOUNTER — CLINICAL SUPPORT (OUTPATIENT)
Dept: REHABILITATION | Facility: HOSPITAL | Age: 37
End: 2022-03-14
Payer: COMMERCIAL

## 2022-03-14 DIAGNOSIS — M25.571 ACUTE BILATERAL ANKLE PAIN: Primary | ICD-10-CM

## 2022-03-14 DIAGNOSIS — M25.572 ACUTE BILATERAL ANKLE PAIN: Primary | ICD-10-CM

## 2022-03-14 PROCEDURE — 97110 THERAPEUTIC EXERCISES: CPT

## 2022-03-14 NOTE — PROGRESS NOTES
"[facility]   Physical Therapy Treatment Note     Name: Tawnya Herrera  Clinic Number: [id number]    Therapy Diagnosis:   Encounter Diagnosis   Name Primary?    Acute bilateral ankle pain Yes     Physician: Paige Paige MD    Visit Date: 3/14/2022    Physician Orders: PT Eval and Treat    Medical Diagnosis from Referral: bilateral ankle pain   Evaluation Date: 3/7/2022  Authorization Period Expiration: 12/01/2022  Plan of Care Expiration: 5/2/2022  Visit # / Visits authorized: 1/20    PTA Visit #: --- /5     Time In: 6:30 am   Time Out: 7:20 am   Total Billable Time: 45 minutes    SUBJECTIVE     Pt reports: she tolerated evaluation well. Is doing HEP.  She was compliant with home exercise program.  Response to previous treatment: good  Functional change: minimal to date     Pain: 1/10  Location: bilateral ankles     OBJECTIVE     Objective Measures updated at progress report unless specified.     Treatment     Kasey received the treatments listed below:      Kasey received therapeutic exercises to develop strength, endurance and ROM for 45 minutes including:     Single leg squat to box ( 20") 3 x 10   Side ly hip external rotation in flexion and extension - 4# 3 x 10 each bilaterally     Monster walk red band 3 rounds 10/10 steps   Latvian split squat 2 x 10 and 1 x 10 with 10# DB bilaterally   Matrix hip abd 80# 4 x 8  Matrix hip add 4 x 8 80#    manual therapy techniques: Joint mobilizations were applied to the: bilateral ankles for 5 minutes, including:  Posterior talar glide    Patient Education and Home Exercises     Home Exercises Provided and Patient Education Provided     Education provided:   - reviewed HEP     Written Home Exercises Provided: Patient instructed to cont prior HEP. Exercises were reviewed and Kasey was able to demonstrate them prior to the end of the session.  Kasey demonstrated good  understanding of the education provided. See EMR under Patient " "Instructions for exercises provided during therapy sessions    ASSESSMENT     Kasey tolerated all exercise well with good effort for all. Tolerated all new exercises and increased resistance well.     Kasey Is progressing well towards her goals.   Pt prognosis is Good.     Pt will continue to benefit from skilled outpatient physical therapy to address the deficits listed in the problem list box on initial evaluation, provide pt/family education and to maximize pt's level of independence in the home and community environment.     Pt's spiritual, cultural and educational needs considered and pt agreeable to plan of care and goals.     Anticipated barriers to physical therapy: none    Goals:   Short Term Goals:    1.I with HEP  2.Patient to report a subjective decrease in pain   3. Patient to demo step down from 4" step with good knee / hip control and no valgus     Long Term Goals:  1. Patient to perform daily activities including walking community distances and at work without limitation.  2. Patient to demonstrate increased hip ER strength to 4/5 or greater.  3. Patient to have no pain on passive eversion  4. Patient to be able to jog x 30 mins with no pain.    PLAN     Plan of care Certification: 3/7/2022 to 5/2/2022.     Outpatient Physical Therapy 2 times weekly for 8 weeks to include the following interventions: Electrical Stimulation prn, dry needling prn, Manual Therapy, Moist Heat/ Ice, Neuromuscular Re-ed, Orthotic Management and Training, Patient Education, Self Care, Therapeutic Activities and Therapeutic Exercise.     Alexander Pruitt, PT       "

## 2022-03-18 ENCOUNTER — CLINICAL SUPPORT (OUTPATIENT)
Dept: REHABILITATION | Facility: HOSPITAL | Age: 37
End: 2022-03-18
Payer: COMMERCIAL

## 2022-03-18 DIAGNOSIS — M25.571 ACUTE BILATERAL ANKLE PAIN: Primary | ICD-10-CM

## 2022-03-18 DIAGNOSIS — M25.572 ACUTE BILATERAL ANKLE PAIN: Primary | ICD-10-CM

## 2022-03-18 PROCEDURE — 97140 MANUAL THERAPY 1/> REGIONS: CPT

## 2022-03-18 PROCEDURE — 97110 THERAPEUTIC EXERCISES: CPT

## 2022-03-18 NOTE — PROGRESS NOTES
"[facility]   Physical Therapy Treatment Note     Name: Tawnya Herrera  Clinic Number: [id number]    Therapy Diagnosis:   Encounter Diagnosis   Name Primary?    Acute bilateral ankle pain Yes     Physician: Paige Paige MD    Visit Date: 3/18/2022    Physician Orders: PT Eval and Treat    Medical Diagnosis from Referral: bilateral ankle pain   Evaluation Date: 3/7/2022  Authorization Period Expiration: 2022  Plan of Care Expiration: 2022  Visit # / Visits authorized:     PTA Visit #: --- /5     Time In: 6:30 am   Time Out: 7:20 am   Total Billable Time: 50 minutes    SUBJECTIVE     Pt reports: she is doing well overall. Soreness continues in dorsomedial ankles .   She was compliant with home exercise program.  Response to previous treatment: good  Functional change: minimal to date     Pain: 1/10  Location: bilateral ankles     OBJECTIVE     Objective Measures updated at progress report unless specified.     Treatment     Kasey received the treatments listed below:      Kasey received therapeutic exercises to develop strength, endurance and ROM for 40 minutes includin way hip 3 reps each 2 rounds per side   'scarlett' heel raise - ball at heels  3 x 15   Single leg squat to box ( 20") 3 x 10   Side ly hip external rotation in hip flexion  - 4# 3 x 10 each bilaterally   Step up / abd to red band 3 x 10 each   SLS on MOBO with palloff press 20# 2 x 10 each         manual therapy techniques: Joint mobilizations were applied to the: bilateral ankles for 10 minutes, including:  Posterior talar glide, talonavicular mobs , talocrural distraction    Patient Education and Home Exercises     Home Exercises Provided and Patient Education Provided     Education provided:   - reviewed HEP     Written Home Exercises Provided: Patient instructed to cont prior HEP. Exercises were reviewed and Kasey was able to demonstrate them prior to the end of the session.  Kasey demonstrated " "good  understanding of the education provided. See EMR under Patient Instructions for exercises provided during therapy sessions    ASSESSMENT     Kasey tolerated all progressions well with good effort for all activities . Tenderness decreased on left foot today.   Kasey Is progressing well towards her goals.   Pt prognosis is Good.     Pt will continue to benefit from skilled outpatient physical therapy to address the deficits listed in the problem list box on initial evaluation, provide pt/family education and to maximize pt's level of independence in the home and community environment.     Pt's spiritual, cultural and educational needs considered and pt agreeable to plan of care and goals.     Anticipated barriers to physical therapy: none    Goals:   Short Term Goals:    1.I with HEP  2.Patient to report a subjective decrease in pain   3. Patient to demo step down from 4" step with good knee / hip control and no valgus     Long Term Goals:  1. Patient to perform daily activities including walking community distances and at work without limitation.  2. Patient to demonstrate increased hip ER strength to 4/5 or greater.  3. Patient to have no pain on passive eversion  4. Patient to be able to jog x 30 mins with no pain.    PLAN     Plan of care Certification: 3/7/2022 to 5/2/2022.     Outpatient Physical Therapy 2 times weekly for 8 weeks to include the following interventions: Electrical Stimulation prn, dry needling prn, Manual Therapy, Moist Heat/ Ice, Neuromuscular Re-ed, Orthotic Management and Training, Patient Education, Self Care, Therapeutic Activities and Therapeutic Exercise.     Alexander Pruitt, PT       "

## 2022-03-21 ENCOUNTER — CLINICAL SUPPORT (OUTPATIENT)
Dept: REHABILITATION | Facility: HOSPITAL | Age: 37
End: 2022-03-21
Payer: COMMERCIAL

## 2022-03-21 DIAGNOSIS — M25.572 ACUTE BILATERAL ANKLE PAIN: Primary | ICD-10-CM

## 2022-03-21 DIAGNOSIS — M25.571 ACUTE BILATERAL ANKLE PAIN: Primary | ICD-10-CM

## 2022-03-21 PROCEDURE — 97110 THERAPEUTIC EXERCISES: CPT

## 2022-03-21 NOTE — PROGRESS NOTES
[facility]   Physical Therapy Treatment Note     Name: aTwnya Herrera  Clinic Number: [id number]    Therapy Diagnosis:   Encounter Diagnosis   Name Primary?    Acute bilateral ankle pain Yes     Physician: Paige Paige MD    Visit Date: 3/21/2022    Physician Orders: PT Eval and Treat    Medical Diagnosis from Referral: bilateral ankle pain   Evaluation Date: 3/7/2022  Authorization Period Expiration: 12/01/2022  Plan of Care Expiration: 5/2/2022  Visit # / Visits authorized: 3/20    PTA Visit #: --- /5     Time In: 6:35 am   Time Out: 7:20 am   Total Billable Time: 50 minutes    SUBJECTIVE     Pt reports: she is doing well overall. Soreness continues more so on the L side with exercises.   She was compliant with home exercise program.  Response to previous treatment: good  Functional change: minimal to date     Pain: 1/10  Location: bilateral ankles     OBJECTIVE     Objective Measures updated at progress report unless specified.     Treatment     Kasey received the treatments listed below:      Kasey received therapeutic exercises to develop strength, endurance and ROM for 40 minutes including:    LE bike x 5 min for joint nutrition  SL sit<>stands with focus on control   SLS on MOBO with overhead press 4# ball x 10 reps and 10 reps torso rotation  Deadlift 2 x 10 30# KB  SL bridge x 15 reps each side  Standing toe yoga x 10 reps each side 5 second holds  SL with band around arch holds x 1 min each side  SL with band around knee holds x 1 min each side        Manual therapy techniques: Joint mobilizations were applied to the: bilateral ankles for 10 minutes, including:  Posterior talar glide, talonavicular mobs , talocrural distraction    Patient Education and Home Exercises     Home Exercises Provided and Patient Education Provided     Education provided:   - reviewed HEP     Written Home Exercises Provided: Patient instructed to cont prior HEP. Exercises were reviewed and Kasey  "was able to demonstrate them prior to the end of the session.  Kasey demonstrated good  understanding of the education provided. See EMR under Patient Instructions for exercises provided during therapy sessions    ASSESSMENT   Patient demonstrated good tolerance to posterior chain strengthening and SL activities this session. No increase in pain, but muscle fatigue reported.       Kasey Is progressing well towards her goals.   Pt prognosis is Good.     Pt will continue to benefit from skilled outpatient physical therapy to address the deficits listed in the problem list box on initial evaluation, provide pt/family education and to maximize pt's level of independence in the home and community environment.     Pt's spiritual, cultural and educational needs considered and pt agreeable to plan of care and goals.     Anticipated barriers to physical therapy: none    Goals:   Short Term Goals:    1.I with HEP  2.Patient to report a subjective decrease in pain   3. Patient to demo step down from 4" step with good knee / hip control and no valgus     Long Term Goals:  1. Patient to perform daily activities including walking community distances and at work without limitation.  2. Patient to demonstrate increased hip ER strength to 4/5 or greater.  3. Patient to have no pain on passive eversion  4. Patient to be able to jog x 30 mins with no pain.    PLAN     Plan of care Certification: 3/7/2022 to 5/2/2022.     Outpatient Physical Therapy 2 times weekly for 8 weeks to include the following interventions: Electrical Stimulation prn, dry needling prn, Manual Therapy, Moist Heat/ Ice, Neuromuscular Re-ed, Orthotic Management and Training, Patient Education, Self Care, Therapeutic Activities and Therapeutic Exercise.     Alexander Pruitt, PT       "

## 2022-03-29 ENCOUNTER — CLINICAL SUPPORT (OUTPATIENT)
Dept: REHABILITATION | Facility: HOSPITAL | Age: 37
End: 2022-03-29
Payer: COMMERCIAL

## 2022-03-29 DIAGNOSIS — R26.9 GAIT ABNORMALITY: ICD-10-CM

## 2022-03-29 DIAGNOSIS — M25.572 ACUTE BILATERAL ANKLE PAIN: Primary | ICD-10-CM

## 2022-03-29 DIAGNOSIS — M25.571 ACUTE BILATERAL ANKLE PAIN: Primary | ICD-10-CM

## 2022-03-29 PROCEDURE — 97140 MANUAL THERAPY 1/> REGIONS: CPT | Mod: CQ

## 2022-03-29 PROCEDURE — 97110 THERAPEUTIC EXERCISES: CPT | Mod: CQ

## 2022-03-29 NOTE — PROGRESS NOTES
[facility]   Physical Therapy Assistant Treatment Note     Name: Tawnya Herrera  Clinic Number: [id number]    Therapy Diagnosis:   Encounter Diagnoses   Name Primary?    Acute bilateral ankle pain Yes    Gait abnormality      Physician: Paige Paige MD    Visit Date: 3/29/2022    Physician Orders: PT Eval and Treat    Medical Diagnosis from Referral: bilateral ankle pain   Evaluation Date: 3/7/2022  Authorization Period Expiration: 12/01/2022  Plan of Care Expiration: 5/2/2022  Visit # / Visits authorized: 4/20    PTA Visit #: 1 /5     Time In: 5:05 pm   Time Out: 5:55 pm   Total Billable Time: 50 minutes    SUBJECTIVE     Pt reports: she is staying away from [employer] workouts and running. Has more pain on left ankle with exercises.  She was compliant with home exercise program.  Response to previous treatment: good   Functional change: minimal to date     Pain: 1/10   Location: bilateral ankles     OBJECTIVE     Objective Measures updated at progress report unless specified.     Treatment     Kasey received the treatments listed below:      Kasey received therapeutic exercises to develop strength, endurance and ROM for 40 minutes including:    LE bike x 5 min for joint nutrition  Talar doming x 15 B  Single leg toe yoga x 10 reps each side 5 second holds  SL sit<>stands with focus on control 2x10  Single leg RDL 2x10  Single leg paloff press GTB 2x10  Single leg heel raises 1x10 B  Standing on blue foam opposite hip abduction 1x10 B  Monster walk red band 3 rounds 10/10 steps          Manual therapy techniques: Joint mobilizations were applied to the: bilateral ankles for 10 minutes, including:  Posterior talar glide, talocrural distraction    Patient Education and Home Exercises     Home Exercises Provided and Patient Education Provided     Education provided:   - reviewed HEP     Written Home Exercises Provided: Patient instructed to cont prior HEP. Exercises were reviewed and Kasey  "was able to demonstrate them prior to the end of the session.  Kasey demonstrated good  understanding of the education provided. See EMR under Patient Instructions for exercises provided during therapy sessions    ASSESSMENT   Patient has increased soreness with additional single leg exercises performed. Increased difficulty with balance more so on left leg. Patient demonstrates glut med weakness with single leg sit to stands so modified stance today.      Kasey Is progressing well towards her goals.   Pt prognosis is Good.     Pt will continue to benefit from skilled outpatient physical therapy to address the deficits listed in the problem list box on initial evaluation, provide pt/family education and to maximize pt's level of independence in the home and community environment.     Pt's spiritual, cultural and educational needs considered and pt agreeable to plan of care and goals.     Anticipated barriers to physical therapy: none    Goals:   Short Term Goals:    1.I with HEP  2.Patient to report a subjective decrease in pain   3. Patient to demo step down from 4" step with good knee / hip control and no valgus     Long Term Goals:  1. Patient to perform daily activities including walking community distances and at work without limitation.  2. Patient to demonstrate increased hip ER strength to 4/5 or greater.  3. Patient to have no pain on passive eversion  4. Patient to be able to jog x 30 mins with no pain.    PLAN     Plan of care Certification: 3/7/2022 to 5/2/2022.     Outpatient Physical Therapy 2 times weekly for 8 weeks to include the following interventions: Electrical Stimulation prn, dry needling prn, Manual Therapy, Moist Heat/ Ice, Neuromuscular Re-ed, Orthotic Management and Training, Patient Education, Self Care, Therapeutic Activities and Therapeutic Exercise.     Alice Wiley, MARVA       "

## 2022-04-01 ENCOUNTER — CLINICAL SUPPORT (OUTPATIENT)
Dept: REHABILITATION | Facility: HOSPITAL | Age: 37
End: 2022-04-01
Payer: COMMERCIAL

## 2022-04-01 DIAGNOSIS — R26.9 GAIT ABNORMALITY: ICD-10-CM

## 2022-04-01 DIAGNOSIS — M25.572 ACUTE BILATERAL ANKLE PAIN: Primary | ICD-10-CM

## 2022-04-01 DIAGNOSIS — M25.571 ACUTE BILATERAL ANKLE PAIN: Primary | ICD-10-CM

## 2022-04-01 PROCEDURE — 97110 THERAPEUTIC EXERCISES: CPT | Mod: CQ

## 2022-04-01 NOTE — PROGRESS NOTES
"[facility]   Physical Therapy Assistant Treatment Note     Name: Tawnya Herrera  Clinic Number: [id number]    Therapy Diagnosis:   Encounter Diagnoses   Name Primary?    Acute bilateral ankle pain Yes    Gait abnormality      Physician: Paige Paige MD    Visit Date: 4/1/2022    Physician Orders: PT Eval and Treat    Medical Diagnosis from Referral: bilateral ankle pain   Evaluation Date: 3/7/2022  Authorization Period Expiration: 12/01/2022  Plan of Care Expiration: 5/2/2022  Visit # / Visits authorized: 5/20    PTA Visit #: 2 /5     Time In: 5:15 pm   Time Out: 6:00 pm   Total Billable Time: 45 minutes    SUBJECTIVE     Pt reports:her right ankle is bothering her today and the left ankle is ok  She was compliant with home exercise program.  Response to previous treatment: no issues  Functional change: na at this time    Pain: 3/10   Location: right ankle    OBJECTIVE     Objective Measures updated at progress report unless specified.     Treatment     Kasey received the treatments listed below:      Kasey received therapeutic exercises to develop strength, endurance and ROM for 45 minutes including:    Upright bike x 5 min for joint nutrition/postural control (added light resistance after 1 min)  Talar doming in standing x 15 B  Lateral stepping on foam beam on balls of feet maintaining level foot   SLS glute med activation 2x10 ea leg  Single leg toe yoga x 10 reps each side 5 second holds  SL sit<>stands from 20" with focus on control x10 ea leg  Heel raises w/SL eccentric lowering x8 ea  Lateral crabwalks GTB--squat position w/ large step followed by a small step keeping the band taunt to maintain glute engagement   SLS on MOBO with overhead press 4# ball x 10 reps and 10 reps torso rotation  Seated DF GTB 2x10  Seated EV GTB  2x10        Manual therapy techniques: Joint mobilizations were applied to the: bilateral ankles for 0 minutes, including:  --    Patient Education and " "Home Exercises     Home Exercises Provided and Patient Education Provided     Education provided:   - reviewed HEP     Written Home Exercises Provided: Patient instructed to cont prior HEP. Exercises were reviewed and Kasey was able to demonstrate them prior to the end of the session.  Kasey demonstrated good  understanding of the education provided. See EMR under Patient Instructions for exercises provided during therapy sessions    ASSESSMENT   Kasey Is progressing well towards her goals.   Kasey presented today with mild right ankle pain. Increased focus on intrinsic work, continued with single limb work. Glute med notably weak with standing single limb work requiring additional training. Kasey demonstrated a positive response to today's session as evidenced with her activity tolerance/progression and no increased pain. Muscle fatigue noted.   Pt prognosis is Good.     Pt will continue to benefit from skilled outpatient physical therapy to address the deficits listed in the problem list box on initial evaluation, provide pt/family education and to maximize pt's level of independence in the home and community environment.     Pt's spiritual, cultural and educational needs considered and pt agreeable to plan of care and goals.     Anticipated barriers to physical therapy: none    Goals:   Short Term Goals:    1.I with HEP  2.Patient to report a subjective decrease in pain   3. Patient to demo step down from 4" step with good knee / hip control and no valgus     Long Term Goals:  1. Patient to perform daily activities including walking community distances and at work without limitation.  2. Patient to demonstrate increased hip ER strength to 4/5 or greater.  3. Patient to have no pain on passive eversion  4. Patient to be able to jog x 30 mins with no pain.    PLAN     Plan of care Certification: 3/7/2022 to 5/2/2022.     Outpatient Physical Therapy 2 times weekly for 8 weeks to include the following interventions: " Electrical Stimulation prn, dry needling prn, Manual Therapy, Moist Heat/ Ice, Neuromuscular Re-ed, Orthotic Management and Training, Patient Education, Self Care, Therapeutic Activities and Therapeutic Exercise.     Solange Lewis, PTA

## 2022-04-04 ENCOUNTER — CLINICAL SUPPORT (OUTPATIENT)
Dept: REHABILITATION | Facility: HOSPITAL | Age: 37
End: 2022-04-04
Payer: COMMERCIAL

## 2022-04-04 ENCOUNTER — OFFICE VISIT (OUTPATIENT)
Dept: SPORTS MEDICINE | Facility: CLINIC | Age: 37
End: 2022-04-04
Payer: COMMERCIAL

## 2022-04-04 VITALS — HEIGHT: 64 IN | WEIGHT: 183 LBS | BODY MASS INDEX: 31.24 KG/M2

## 2022-04-04 DIAGNOSIS — R26.9 GAIT ABNORMALITY: ICD-10-CM

## 2022-04-04 DIAGNOSIS — M25.572 ACUTE BILATERAL ANKLE PAIN: Primary | ICD-10-CM

## 2022-04-04 DIAGNOSIS — M25.571 ACUTE BILATERAL ANKLE PAIN: Primary | ICD-10-CM

## 2022-04-04 DIAGNOSIS — M76.822 POSTERIOR TIBIALIS TENDINITIS OF BOTH LOWER EXTREMITIES: ICD-10-CM

## 2022-04-04 DIAGNOSIS — R26.9 GAIT ABNORMALITY: Primary | ICD-10-CM

## 2022-04-04 DIAGNOSIS — M76.821 POSTERIOR TIBIALIS TENDINITIS OF BOTH LOWER EXTREMITIES: ICD-10-CM

## 2022-04-04 PROCEDURE — 1159F MED LIST DOCD IN RCRD: CPT | Mod: CPTII,S$GLB,, | Performed by: PHYSICAL MEDICINE & REHABILITATION

## 2022-04-04 PROCEDURE — 3008F BODY MASS INDEX DOCD: CPT | Mod: CPTII,S$GLB,, | Performed by: PHYSICAL MEDICINE & REHABILITATION

## 2022-04-04 PROCEDURE — 3008F PR BODY MASS INDEX (BMI) DOCUMENTED: ICD-10-PCS | Mod: CPTII,S$GLB,, | Performed by: PHYSICAL MEDICINE & REHABILITATION

## 2022-04-04 PROCEDURE — 97110 THERAPEUTIC EXERCISES: CPT

## 2022-04-04 PROCEDURE — 99999 PR PBB SHADOW E&M-EST. PATIENT-LVL III: ICD-10-PCS | Mod: PBBFAC,,, | Performed by: PHYSICAL MEDICINE & REHABILITATION

## 2022-04-04 PROCEDURE — 99213 PR OFFICE/OUTPT VISIT, EST, LEVL III, 20-29 MIN: ICD-10-PCS | Mod: S$GLB,,, | Performed by: PHYSICAL MEDICINE & REHABILITATION

## 2022-04-04 PROCEDURE — 1160F PR REVIEW ALL MEDS BY PRESCRIBER/CLIN PHARMACIST DOCUMENTED: ICD-10-PCS | Mod: CPTII,S$GLB,, | Performed by: PHYSICAL MEDICINE & REHABILITATION

## 2022-04-04 PROCEDURE — 1159F PR MEDICATION LIST DOCUMENTED IN MEDICAL RECORD: ICD-10-PCS | Mod: CPTII,S$GLB,, | Performed by: PHYSICAL MEDICINE & REHABILITATION

## 2022-04-04 PROCEDURE — 99999 PR PBB SHADOW E&M-EST. PATIENT-LVL III: CPT | Mod: PBBFAC,,, | Performed by: PHYSICAL MEDICINE & REHABILITATION

## 2022-04-04 PROCEDURE — 99213 OFFICE O/P EST LOW 20 MIN: CPT | Mod: S$GLB,,, | Performed by: PHYSICAL MEDICINE & REHABILITATION

## 2022-04-04 PROCEDURE — 1160F RVW MEDS BY RX/DR IN RCRD: CPT | Mod: CPTII,S$GLB,, | Performed by: PHYSICAL MEDICINE & REHABILITATION

## 2022-04-04 NOTE — PROGRESS NOTES
[facility]   Physical Therapy Treatment Note     Name: Tawnya Herrera  Clinic Number: [id number]    Therapy Diagnosis:   Encounter Diagnosis   Name Primary?    Gait abnormality Yes     Physician: Paige Paige MD    Visit Date: 4/4/2022    Physician Orders: PT Eval and Treat    Medical Diagnosis from Referral: bilateral ankle pain   Evaluation Date: 3/7/2022  Authorization Period Expiration: 12/01/2022  Plan of Care Expiration: 5/2/2022  Visit # / Visits authorized: 6/20    PTA Visit #: --- /5     Time In: 6:30 am   Time Out: 7:15 am   Total Billable Time: 45 minutes    SUBJECTIVE     Pt reports: that she has been feeling better.   She was compliant with home exercise program.  Response to previous treatment: no issues  Functional change: improvement in adverse symptoms    Pain: 2/10   Location: right ankle    OBJECTIVE     Objective Measures updated at progress report unless specified.     Treatment     Kasey received the treatments listed below:      Kasey received therapeutic exercises to develop strength, endurance and ROM for 40 minutes including:    LE bike x 5 min for joint nutrition/postural control   SL bridge x 15 reps each side  Single leg toe yoga x 10 reps each side 5 second holds  Superset 2 rounds: Yoe carry 10# KB and 5 step ups onto foam with hip drive  Heel raises w/SL eccentric lowering x8 ea  B heel raise with ankle squeeze x 20 reps  SLS on MOBO with paloff 10# x 15 reps each side    Manual therapy techniques: Joint mobilizations were applied to the: bilateral ankles for 5 minutes, including:  Talocrural manip  Forefoot splaying    Patient Education and Home Exercises     Home Exercises Provided and Patient Education Provided     Education provided:   - reviewed HEP     Written Home Exercises Provided: Patient instructed to cont prior HEP. Exercises were reviewed and Kasey was able to demonstrate them prior to the end of the session.  Kasey demonstrated good   "understanding of the education provided. See EMR under Patient Instructions for exercises provided during therapy sessions    ASSESSMENT   Kasey Is progressing well towards her goals.     Patient demonstrated a lot better tolerance to SL work this session with more hip control noted. Still some navicular drop, but improving.    Pt prognosis is Good.     Pt will continue to benefit from skilled outpatient physical therapy to address the deficits listed in the problem list box on initial evaluation, provide pt/family education and to maximize pt's level of independence in the home and community environment.     Pt's spiritual, cultural and educational needs considered and pt agreeable to plan of care and goals.     Anticipated barriers to physical therapy: none    Goals:   Short Term Goals:    1.I with HEP  MET   2.Patient to report a subjective decrease in pain  MET   3. Patient to demo step down from 4" step with good knee / hip control and no valgus   IN PROGRESS     Long Term Goals:  1. Patient to perform daily activities including walking community distances and at work without limitation.  IN PROGRESS  2. Patient to demonstrate increased hip ER strength to 4/5 or greater.  IN PROGRESS  3. Patient to have no pain on passive eversion  IN PROGRESS  4. Patient to be able to jog x 30 mins with no pain.  IN PROGRESS    PLAN     Plan of care Certification: 3/7/2022 to 5/2/2022.     Outpatient Physical Therapy 2 times weekly for 8 weeks to include the following interventions: Electrical Stimulation prn, dry needling prn, Manual Therapy, Moist Heat/ Ice, Neuromuscular Re-ed, Orthotic Management and Training, Patient Education, Self Care, Therapeutic Activities and Therapeutic Exercise.     Alexander Pruitt, PT       "

## 2022-04-04 NOTE — PATIENT INSTRUCTIONS
Assessment:  Tawnya Herrera is a 36 y.o. female   Chief Complaint   Patient presents with    Left Ankle - Pain    Right Ankle - Pain       Acute bilateral ankle pain    Posterior tibialis tendinitis of both lower extremities      Plan:  We personally reviewed her imaging today in clinic and agree with the radiologist's report.  Continue with physical therapy at [facility] if no improvement, then consider advanced imaging.   Try happy feet orthotics  Try a shoe with a wider toe box, maybe look into Ultra's   Correct toes or toes spreaders        Lower Quarter Stability: One of our ultimate goals is being able to find and maintain good leg positioning during exercise and ultimately with running. A common fault is when the knee dips and rotates inwards, towards the opposite knee. This can occur from the foot over-pronating and / or the glutes not doing their job. Toe yoga and hip exercises are a good starting point, but we need to learn how to tie these motions together for better stability.     This is where Raphael's foam roller foot & hip drill comes in! The video starts around the 48:20 edwina when he spends about 5 minutes demonstrating this awesome drill. Practice that a few minutes daily until it feels easy!      Follow-up:  4 weeks or sooner if there are any problems between now and then.    Thank you for choosing [facility] and Dr. Paige Paige for your orthopedic & sports medicine care. It is our goal to provide you with exceptional care that will help keep you healthy, active, and get you back in the game.    Please do not hesitate to reach out to us via email, phone, or MyChart with any questions, concerns, or feedback.    If you felt that you received exemplary care today, please consider leaving us feedback on [employer] at:  [email or web address]     If you are experiencing pain/discomfort ,or have questions after 5pm and would like to be connected  to the [facility] on-call team, please call this number and specify which Sports Medicine provider is treating you: [phone]

## 2022-04-04 NOTE — PROGRESS NOTES
SPORTS MEDICINE / PM&R Follow Up Visit :    Referring Physician: No ref. provider found    Chief Complaint   Patient presents with    Left Ankle - Pain    Right Ankle - Pain       HPI: This is a 36 y.o.  female being seen in clinic today for evaluation of Pain of the Left Ankle and Pain of the Right Ankle       She was last seen in clinic 2/21/2022. Since last visit, the symptoms show no change.  She has been to therapy. She has tried physical therapy, changed footwear, toe yoga, MOBO board, single leg sit to stand and balance exercise and some glute and hip exercise for this problem with some improvement.       History obtained from patient.  Patient states that she has noticed that her left ankle is hurting a little more than her right lately.  She has been doing a great deal of walking lately.  She is currently doing PT at [facility] with Aleyda, with her last being this morning.  She currently rates her pain at a 2/10.  She has been attending PT 2 x/ week and is about to go down to 1 x/ week.    Past family, medical, social, surgical history, and vital signs reviewed in chart.      General    Nursing note and vitals reviewed.  Constitutional: She is oriented to person, place, and time. She appears well-developed and well-nourished.   HENT:   Head: Normocephalic and atraumatic.   Eyes: Conjunctivae and EOM are normal. Pupils are equal, round, and reactive to light.   Neck: Neck supple.   Cardiovascular: Intact distal pulses.    Pulmonary/Chest: Effort normal. No respiratory distress.   Abdominal: She exhibits no distension.   Neurological: She is alert and oriented to person, place, and time. She has normal reflexes.   Psychiatric: She has a normal mood and affect.     General Musculoskeletal Exam   Gait: normal     Right Ankle/Foot Exam   Right ankle exam is normal.    Inspection   Deformity: absent  Erythema: absent  Bruising: Ankle - absent Foot - absent  Effusion: Ankle - absent Foot -  absent  Atrophy: Ankle - absent Foot - absent    Range of Motion   Ankle Joint   Dorsiflexion: normal   Plantar flexion: normal   Subtalar Joint   Inversion: normal   Eversion: normal   First MTP Joint: normal    Alignment   Hindfoot Alignment: neutral  Forefoot Alignment: normal    Muscle Strength   The patient has normal right ankle strength.    Other   Sensation: normal    Comments:  She was tender to palpation near the anterior aspect of the medial malleolus and the anterior medial ankle joint bilaterally.  There is no significant tenderness over the tip posterior tendon or the plantar fascia.  She had relatively well-preserved range of motion bilaterally.  She was noted to have flexible pes planus and could not maintain arch height in stance.  She did show calcaneal inversion with double leg calf raises.  She had somewhat poor balance and stability in single leg stance.    Left Ankle/Foot Exam   Left ankle exam is normal.    Inspection  Deformity: absent  Erythema: absent  Bruising: Ankle - absent Foot - absent  Effusion: Ankle - absent Foot - absent  Atrophy: Ankle - absent Foot - absent    Range of Motion   Ankle Joint  Dorsiflexion: normal   Plantar flexion: normal     Subtalar Joint   Inversion: normal   Eversion: normal   First MTP Joint: normal    Alignment   Hindfoot Alignment: neutral  Forefoot Alignment: normal    Muscle Strength   The patient has normal left ankle strength.    Other   Sensation: normal    Comments:  She's tender to palpation over posterior medial malleous and antetior medial malleolus      Reflexes     Left Side  Achilles:  2+    Right Side   Achilles:  2+    Vascular Exam     Right Pulses  Dorsalis Pedis:      2+          Left Pulses  Dorsalis Pedis:      2+              X-Ray Ankle Complete Bilateral  Narrative: EXAMINATION:  XR ANKLE COMPLETE 3 VIEW BILATERAL    CLINICAL HISTORY:  Pain in right ankle and joints of right foot    TECHNIQUE:  AP, lateral and oblique views of both  ankles were performed.    COMPARISON:  None    FINDINGS:  No fracture or dislocation.  No radiographic evidence of an osteochondral lesion.  Well corticated density projects along the inferior margin of the right lateral malleolus suggestive of an accessory ossicle.  Soft tissues are within normal limits.  Impression: As above    Electronically signed by: Maikel Rome MD  Date:    02/21/2022  Time:    08:04            Patient Instructions     Assessment:  Tawnya Herrera is a 36 y.o. female   Chief Complaint   Patient presents with    Left Ankle - Pain    Right Ankle - Pain       Acute bilateral ankle pain    Posterior tibialis tendinitis of both lower extremities      Plan:   We personally reviewed her imaging today in clinic and agree with the radiologist's report.   Continue with physical therapy at [facility] if no improvement, then consider advanced imaging.    Try happy feet orthotics   Try a shoe with a wider toe box, maybe look into Ultra's    Correct toes or toes spreaders        Lower Quarter Stability: One of our ultimate goals is being able to find and maintain good leg positioning during exercise and ultimately with running. A common fault is when the knee dips and rotates inwards, towards the opposite knee. This can occur from the foot over-pronating and / or the glutes not doing their job. Toe yoga and hip exercises are a good starting point, but we need to learn how to tie these motions together for better stability.     This is where Raphael's foam roller foot & hip drill comes in! The video starts around the 48:20 edwina when he spends about 5 minutes demonstrating this awesome drill. Practice that a few minutes daily until it feels easy!      Follow-up:  4 weeks or sooner if there are any problems between now and then.    Thank you for choosing [facility] and Dr. Paige Paige for your orthopedic & sports medicine care. It is our goal to provide you with exceptional care that  will help keep you healthy, active, and get you back in the game.    Please do not hesitate to reach out to us via email, phone, or MyChart with any questions, concerns, or feedback.    If you felt that you received exemplary care today, please consider leaving us feedback on Healthgrades at:  [email or web address]     If you are experiencing pain/discomfort ,or have questions after 5pm and would like to be connected to the [facility] on-call team, please call this number and specify which Sports Medicine provider is treating you: [phone]          Disclaimer: This note was prepared using a voice recognition system and is likely to have sound alike errors within the text.     Paige Paige M.D.  Sports Medicine

## 2022-04-08 ENCOUNTER — PATIENT MESSAGE (OUTPATIENT)
Dept: SPORTS MEDICINE | Facility: CLINIC | Age: 37
End: 2022-04-08
Payer: COMMERCIAL

## 2022-04-08 ENCOUNTER — TELEPHONE (OUTPATIENT)
Dept: SPORTS MEDICINE | Facility: CLINIC | Age: 37
End: 2022-04-08
Payer: COMMERCIAL

## 2022-04-08 ENCOUNTER — CLINICAL SUPPORT (OUTPATIENT)
Dept: REHABILITATION | Facility: HOSPITAL | Age: 37
End: 2022-04-08
Payer: COMMERCIAL

## 2022-04-08 DIAGNOSIS — M25.571 ACUTE BILATERAL ANKLE PAIN: ICD-10-CM

## 2022-04-08 DIAGNOSIS — M25.572 ACUTE BILATERAL ANKLE PAIN: ICD-10-CM

## 2022-04-08 DIAGNOSIS — R26.9 GAIT ABNORMALITY: Primary | ICD-10-CM

## 2022-04-08 PROCEDURE — 97140 MANUAL THERAPY 1/> REGIONS: CPT

## 2022-04-08 PROCEDURE — 97110 THERAPEUTIC EXERCISES: CPT

## 2022-04-08 NOTE — PROGRESS NOTES
[facility]   Physical Therapy Treatment Note     Name: Tawnya Herrera  Clinic Number: [id number]    Therapy Diagnosis:   Encounter Diagnoses   Name Primary?    Gait abnormality Yes    Acute bilateral ankle pain      Physician: Paige Paige MD    Visit Date: 4/8/2022    Physician Orders: PT Eval and Treat    Medical Diagnosis from Referral: bilateral ankle pain   Evaluation Date: 3/7/2022  Authorization Period Expiration: 12/01/2022  Plan of Care Expiration: 5/2/2022  Visit # / Visits authorized: 7/20    PTA Visit #: --- /5     Time In: 5:15pm  Time Out: 6:00 pm   Total Billable Time: 43 minutes    SUBJECTIVE     Pt reports: most pain on L foot and less pain on R foot.    She was compliant with home exercise program.  Response to previous treatment: no issues  Functional change: improvement in adverse symptoms    Pain: 0/10  Location: right ankle    OBJECTIVE     Objective Measures updated at progress report unless specified.     Treatment     Kasey received the treatments listed below:      Kasey received therapeutic exercises to develop strength, endurance and ROM for 35 minutes including:    Upright  bike x 5 min for joint nutrition/postural control   SL bridge x 15 reps each side  Single leg toe yoga x 10 reps each side 5 second holds  Superset 2 rounds: [place] carry 10# KB and 5 step ups onto foam with hip drive  Heel raises w/SL eccentric lowering x8 ea  B heel raise with ankle squeeze x 20 reps  SLS on MOBO with paloff 10# x 15 reps each side    Manual therapy techniques: Joint mobilizations were applied to the: bilateral ankles for 8 minutes, including:  B distal tibia fibula mob  B A-P Talocrural mob  B Talocrural distraction mob Forefoot splaying    Patient Education and Home Exercises     Home Exercises Provided and Patient Education Provided     Education provided:   - reviewed HEP     Written Home Exercises Provided: Patient instructed to cont prior HEP. Exercises were  "reviewed and Kasey was able to demonstrate them prior to the end of the session.  Kasey demonstrated good  understanding of the education provided. See EMR under Patient Instructions for exercises provided during therapy sessions    ASSESSMENT   Kasey Is progressing well towards her goals.     Patient cont. To have good tolerance to SL work this session with more hip control noted. Pt. Had mild edema to L medial ankle.  Pt. Only had discomfort with heel raises with post. Tibialis bias.  Pt. Recommended light compression sock.  Pt. Reminded to obtain order Surefeet orthotic and toe spacers.    Pt prognosis is Good.     Pt will continue to benefit from skilled outpatient physical therapy to address the deficits listed in the problem list box on initial evaluation, provide pt/family education and to maximize pt's level of independence in the home and community environment.     Pt's spiritual, cultural and educational needs considered and pt agreeable to plan of care and goals.     Anticipated barriers to physical therapy: none    Goals:   Short Term Goals:    1.I with HEP  MET   2.Patient to report a subjective decrease in pain  MET   3. Patient to demo step down from 4" step with good knee / hip control and no valgus   IN PROGRESS     Long Term Goals:  1. Patient to perform daily activities including walking community distances and at work without limitation.  IN PROGRESS  2. Patient to demonstrate increased hip ER strength to 4/5 or greater.  IN PROGRESS  3. Patient to have no pain on passive eversion  IN PROGRESS  4. Patient to be able to jog x 30 mins with no pain.  IN PROGRESS    PLAN     Plan of care Certification: 3/7/2022 to 5/2/2022.     Outpatient Physical Therapy 2 times weekly for 8 weeks to include the following interventions: Electrical Stimulation prn, dry needling prn, Manual Therapy, Moist Heat/ Ice, Neuromuscular Re-ed, Orthotic Management and Training, Patient Education, Self Care, Therapeutic " Activities and Therapeutic Exercise.     Sol Hernandez, PT

## 2022-04-08 NOTE — TELEPHONE ENCOUNTER
SHANAM and sent [employer] Message regarding change in appointment time due to provider being out of clinic at scheduled appointment time.  New appointment has been scheduled for May 4 at 11 am.  Asked that if this appointment time doesn't work for patient to contact us either through phone or through [employer] and we will reschedule.

## 2022-04-12 ENCOUNTER — CLINICAL SUPPORT (OUTPATIENT)
Dept: REHABILITATION | Facility: HOSPITAL | Age: 37
End: 2022-04-12
Payer: COMMERCIAL

## 2022-04-12 DIAGNOSIS — R26.9 GAIT ABNORMALITY: Primary | ICD-10-CM

## 2022-04-12 DIAGNOSIS — M25.572 ACUTE BILATERAL ANKLE PAIN: ICD-10-CM

## 2022-04-12 DIAGNOSIS — M25.571 ACUTE BILATERAL ANKLE PAIN: ICD-10-CM

## 2022-04-12 PROCEDURE — 97140 MANUAL THERAPY 1/> REGIONS: CPT

## 2022-04-12 PROCEDURE — 97110 THERAPEUTIC EXERCISES: CPT

## 2022-04-12 NOTE — PROGRESS NOTES
[facility]   Physical Therapy Treatment Note     Name: Tawnya Herrera  Clinic Number: [id number]    Therapy Diagnosis:   Encounter Diagnoses   Name Primary?    Gait abnormality Yes    Acute bilateral ankle pain      Physician: Paige Paige MD    Visit Date: 2022    Physician Orders: PT Eval and Treat    Medical Diagnosis from Referral: bilateral ankle pain   Evaluation Date: 3/7/2022  Authorization Period Expiration: 2022  Plan of Care Expiration: 2022  Visit # / Visits authorized:     PTA Visit #: --- /5     Time In: 6:30 am   Time Out: 7:25 am   Total Billable Time: 50  minutes    SUBJECTIVE     Pt reports: she is doing well over the past week. Symptoms notably improved.   She was compliant with home exercise program.  Response to previous treatment: no issues  Functional change: improvement in adverse symptoms    Pain: 0/10  Location: right ankle    OBJECTIVE     Objective Measures updated at progress report unless specified.     Treatment     Kasey received the treatments listed below:      Kasey received therapeutic exercises to develop strength, endurance and ROM for 40 minutes includin way hip 5 reps each bilaterally   Side ly hip abd  4# 2 x 10  SL bridge 4 x 8  reps each side  Single leg toe yoga x 10 reps each side 5 second holds  Superset 2 rounds: [place] carry 10# KB and 10 split squats   Heel raises w/SL eccentric lowering x8 ea  B heel raise with ankle squeeze x 20 reps  SLS on MOBO with paloff 20# x 20 reps each side alternating directions from pulley each 10    Manual therapy techniques: Joint mobilizations were applied to the: bilateral ankles for 10 minutes, including:  B distal tibia fibula mob  Dorsal navicular , cuneiform glides - grade 2-3     Patient Education and Home Exercises     Home Exercises Provided and Patient Education Provided     Education provided:   - reviewed HEP     Written Home Exercises Provided: Patient instructed  "to cont prior HEP. Exercises were reviewed and Kasey was able to demonstrate them prior to the end of the session.  Kasey demonstrated good  understanding of the education provided. See EMR under Patient Instructions for exercises provided during therapy sessions    ASSESSMENT   Kasey Is progressing well towards her goals.     Patient continues to progress well with strength and function. Reports improvement in balance subjectively. Tolerated progressions in resistance and exercise well.   Pt. Reminded to obtain order Surefeet orthotic and toe spacers.    Pt prognosis is Good.     Pt will continue to benefit from skilled outpatient physical therapy to address the deficits listed in the problem list box on initial evaluation, provide pt/family education and to maximize pt's level of independence in the home and community environment.     Pt's spiritual, cultural and educational needs considered and pt agreeable to plan of care and goals.     Anticipated barriers to physical therapy: none    Goals:   Short Term Goals:    1.I with HEP  MET   2.Patient to report a subjective decrease in pain  MET   3. Patient to demo step down from 4" step with good knee / hip control and no valgus   IN PROGRESS     Long Term Goals:  1. Patient to perform daily activities including walking community distances and at work without limitation.  IN PROGRESS  2. Patient to demonstrate increased hip ER strength to 4/5 or greater.  IN PROGRESS  3. Patient to have no pain on passive eversion  IN PROGRESS  4. Patient to be able to jog x 30 mins with no pain.  IN PROGRESS    PLAN     Plan of care Certification: 3/7/2022 to 5/2/2022.     Outpatient Physical Therapy 1-2 times weekly for 4 weeks to include the following interventions: Electrical Stimulation prn, dry needling prn, Manual Therapy, Moist Heat/ Ice, Neuromuscular Re-ed, Orthotic Management and Training, Patient Education, Self Care, Therapeutic Activities and Therapeutic Exercise. "     Alexander Pruitt, PT

## 2025-05-12 ENCOUNTER — OFFICE VISIT (OUTPATIENT)
Dept: INTERNAL MEDICINE | Facility: CLINIC | Age: 40
End: 2025-05-12
Payer: COMMERCIAL

## 2025-05-12 VITALS
TEMPERATURE: 97 F | RESPIRATION RATE: 18 BRPM | BODY MASS INDEX: 31.2 KG/M2 | OXYGEN SATURATION: 99 % | HEIGHT: 64 IN | SYSTOLIC BLOOD PRESSURE: 116 MMHG | DIASTOLIC BLOOD PRESSURE: 78 MMHG | HEART RATE: 70 BPM | WEIGHT: 182.75 LBS

## 2025-05-12 DIAGNOSIS — Z00.00 ANNUAL PHYSICAL EXAM: Primary | ICD-10-CM

## 2025-05-12 PROCEDURE — 3078F DIAST BP <80 MM HG: CPT | Mod: CPTII,S$GLB,, | Performed by: PHYSICIAN ASSISTANT

## 2025-05-12 PROCEDURE — 1159F MED LIST DOCD IN RCRD: CPT | Mod: CPTII,S$GLB,, | Performed by: PHYSICIAN ASSISTANT

## 2025-05-12 PROCEDURE — 99999 PR PBB SHADOW E&M-EST. PATIENT-LVL IV: CPT | Mod: PBBFAC,,, | Performed by: PHYSICIAN ASSISTANT

## 2025-05-12 PROCEDURE — 1160F RVW MEDS BY RX/DR IN RCRD: CPT | Mod: CPTII,S$GLB,, | Performed by: PHYSICIAN ASSISTANT

## 2025-05-12 PROCEDURE — 99395 PREV VISIT EST AGE 18-39: CPT | Mod: S$GLB,,, | Performed by: PHYSICIAN ASSISTANT

## 2025-05-12 PROCEDURE — 3008F BODY MASS INDEX DOCD: CPT | Mod: CPTII,S$GLB,, | Performed by: PHYSICIAN ASSISTANT

## 2025-05-12 PROCEDURE — 3074F SYST BP LT 130 MM HG: CPT | Mod: CPTII,S$GLB,, | Performed by: PHYSICIAN ASSISTANT

## 2025-05-12 NOTE — PROGRESS NOTES
"Subjective:      Patient ID: Tawnya Herrera is a 39 y.o. female.    Chief Complaint: Annual Exam (She is here for an annual exam, would like to discuss having labs drawn. Also would like to discuss having paperwork filled out for a physical for  evaluation. )    Patient is new to clinic, being seen today for annual and form completion.   Denies current concerns/complaints     Requests forms for  evaluation be completed       Review of Systems   Constitutional:  Negative for chills, diaphoresis and fever.   HENT:  Negative for congestion, rhinorrhea and sore throat.    Respiratory:  Negative for cough, shortness of breath and wheezing.    Gastrointestinal:  Negative for abdominal pain, constipation, diarrhea, nausea and vomiting.   Skin:  Negative for rash.   Neurological:  Negative for dizziness, light-headedness and headaches.       Objective:   /78 (BP Location: Left arm, Patient Position: Sitting)   Pulse 70   Temp 96.8 °F (36 °C) (Tympanic)   Resp 18   Ht 5' 4" (1.626 m)   Wt 82.9 kg (182 lb 12.2 oz)   LMP 04/28/2025 (Approximate)   SpO2 99%   BMI 31.37 kg/m²   Physical Exam  Constitutional:       General: She is not in acute distress.     Appearance: Normal appearance. She is well-developed. She is not ill-appearing.   HENT:      Head: Normocephalic and atraumatic.      Right Ear: Hearing, tympanic membrane, ear canal and external ear normal.      Left Ear: Hearing, tympanic membrane, ear canal and external ear normal.      Nose: Nose normal.      Mouth/Throat:      Mouth: Mucous membranes are moist.      Pharynx: Oropharynx is clear.   Neck:      Thyroid: No thyromegaly.      Trachea: Trachea normal.   Cardiovascular:      Rate and Rhythm: Normal rate and regular rhythm.      Heart sounds: Normal heart sounds. No murmur heard.  Pulmonary:      Effort: Pulmonary effort is normal. No respiratory distress.      Breath sounds: Normal breath sounds. No decreased breath " sounds.   Musculoskeletal:      Right lower leg: No edema.      Left lower leg: No edema.   Skin:     General: Skin is warm and dry.      Findings: No rash.   Psychiatric:         Speech: Speech normal.         Behavior: Behavior normal.         Thought Content: Thought content normal.       Assessment:      1. Annual physical exam       Plan:   Annual physical exam  -     CBC Auto Differential; Future; Expected date: 05/12/2025  -     Comprehensive Metabolic Panel; Future; Expected date: 05/12/2025  -     Hemoglobin A1C; Future; Expected date: 05/12/2025  -     Lipid Panel; Future; Expected date: 05/12/2025  -     TSH; Future; Expected date: 05/12/2025      Form completed     Fasting labs     1yr f/u or sooner if needed      Yes

## 2025-05-13 ENCOUNTER — LAB VISIT (OUTPATIENT)
Dept: LAB | Facility: HOSPITAL | Age: 40
End: 2025-05-13
Payer: COMMERCIAL

## 2025-05-13 DIAGNOSIS — Z00.00 ANNUAL PHYSICAL EXAM: ICD-10-CM

## 2025-05-13 LAB
ABSOLUTE EOSINOPHIL (OHS): 0.12 K/UL
ABSOLUTE MONOCYTE (OHS): 0.38 K/UL (ref 0.3–1)
ABSOLUTE NEUTROPHIL COUNT (OHS): 2.24 K/UL (ref 1.8–7.7)
ALBUMIN SERPL BCP-MCNC: 3.7 G/DL (ref 3.5–5.2)
ALP SERPL-CCNC: 48 UNIT/L (ref 40–150)
ALT SERPL W/O P-5'-P-CCNC: 12 UNIT/L (ref 10–44)
ANION GAP (OHS): 10 MMOL/L (ref 8–16)
AST SERPL-CCNC: 13 UNIT/L (ref 11–45)
BASOPHILS # BLD AUTO: 0.03 K/UL
BASOPHILS NFR BLD AUTO: 0.7 %
BILIRUB SERPL-MCNC: 0.4 MG/DL (ref 0.1–1)
BUN SERPL-MCNC: 14 MG/DL (ref 6–20)
CALCIUM SERPL-MCNC: 8.7 MG/DL (ref 8.7–10.5)
CHLORIDE SERPL-SCNC: 107 MMOL/L (ref 95–110)
CHOLEST SERPL-MCNC: 210 MG/DL (ref 120–199)
CHOLEST/HDLC SERPL: 2.9 {RATIO} (ref 2–5)
CO2 SERPL-SCNC: 23 MMOL/L (ref 23–29)
CREAT SERPL-MCNC: 0.7 MG/DL (ref 0.5–1.4)
EAG (OHS): 97 MG/DL (ref 68–131)
ERYTHROCYTE [DISTWIDTH] IN BLOOD BY AUTOMATED COUNT: 12.5 % (ref 11.5–14.5)
GFR SERPLBLD CREATININE-BSD FMLA CKD-EPI: >60 ML/MIN/1.73/M2
GLUCOSE SERPL-MCNC: 80 MG/DL (ref 70–110)
HBA1C MFR BLD: 5 % (ref 4–5.6)
HCT VFR BLD AUTO: 37.3 % (ref 37–48.5)
HDLC SERPL-MCNC: 73 MG/DL (ref 40–75)
HDLC SERPL: 34.8 % (ref 20–50)
HGB BLD-MCNC: 11.6 GM/DL (ref 12–16)
IMM GRANULOCYTES # BLD AUTO: 0 K/UL (ref 0–0.04)
IMM GRANULOCYTES NFR BLD AUTO: 0 % (ref 0–0.5)
LDLC SERPL CALC-MCNC: 123.4 MG/DL (ref 63–159)
LYMPHOCYTES # BLD AUTO: 1.6 K/UL (ref 1–4.8)
MCH RBC QN AUTO: 28.2 PG (ref 27–31)
MCHC RBC AUTO-ENTMCNC: 31.1 G/DL (ref 32–36)
MCV RBC AUTO: 91 FL (ref 82–98)
NONHDLC SERPL-MCNC: 137 MG/DL
NUCLEATED RBC (/100WBC) (OHS): 0 /100 WBC
PLATELET # BLD AUTO: 240 K/UL (ref 150–450)
PMV BLD AUTO: 9.5 FL (ref 9.2–12.9)
POTASSIUM SERPL-SCNC: 4.3 MMOL/L (ref 3.5–5.1)
PROT SERPL-MCNC: 6.4 GM/DL (ref 6–8.4)
RBC # BLD AUTO: 4.11 M/UL (ref 4–5.4)
RELATIVE EOSINOPHIL (OHS): 2.7 %
RELATIVE LYMPHOCYTE (OHS): 36.6 % (ref 18–48)
RELATIVE MONOCYTE (OHS): 8.7 % (ref 4–15)
RELATIVE NEUTROPHIL (OHS): 51.3 % (ref 38–73)
SODIUM SERPL-SCNC: 140 MMOL/L (ref 136–145)
TRIGL SERPL-MCNC: 68 MG/DL (ref 30–150)
TSH SERPL-ACNC: 3.77 UIU/ML (ref 0.4–4)
WBC # BLD AUTO: 4.37 K/UL (ref 3.9–12.7)

## 2025-05-13 PROCEDURE — 80061 LIPID PANEL: CPT

## 2025-05-13 PROCEDURE — 80053 COMPREHEN METABOLIC PANEL: CPT

## 2025-05-13 PROCEDURE — 84443 ASSAY THYROID STIM HORMONE: CPT

## 2025-05-13 PROCEDURE — 83036 HEMOGLOBIN GLYCOSYLATED A1C: CPT

## 2025-05-13 PROCEDURE — 36415 COLL VENOUS BLD VENIPUNCTURE: CPT

## 2025-05-13 PROCEDURE — 85025 COMPLETE CBC W/AUTO DIFF WBC: CPT

## 2025-05-14 ENCOUNTER — RESULTS FOLLOW-UP (OUTPATIENT)
Dept: INTERNAL MEDICINE | Facility: CLINIC | Age: 40
End: 2025-05-14